# Patient Record
Sex: FEMALE | Race: WHITE | Employment: OTHER | URBAN - METROPOLITAN AREA
[De-identification: names, ages, dates, MRNs, and addresses within clinical notes are randomized per-mention and may not be internally consistent; named-entity substitution may affect disease eponyms.]

---

## 2017-01-08 ENCOUNTER — HOSPITAL ENCOUNTER (EMERGENCY)
Facility: HOSPITAL | Age: 57
Discharge: HOME | End: 2017-01-08
Payer: COMMERCIAL

## 2017-01-08 ENCOUNTER — APPOINTMENT (OUTPATIENT)
Dept: GENERAL RADIOLOGY | Facility: HOSPITAL | Age: 57
End: 2017-01-08
Payer: COMMERCIAL

## 2017-01-08 VITALS
WEIGHT: 180 LBS | HEART RATE: 67 BPM | RESPIRATION RATE: 16 BRPM | HEIGHT: 64 IN | SYSTOLIC BLOOD PRESSURE: 122 MMHG | DIASTOLIC BLOOD PRESSURE: 81 MMHG | TEMPERATURE: 98 F | OXYGEN SATURATION: 98 % | BODY MASS INDEX: 30.73 KG/M2

## 2017-01-08 DIAGNOSIS — S01.21XA NASAL LACERATION, INITIAL ENCOUNTER: Primary | ICD-10-CM

## 2017-01-08 PROCEDURE — 90471 IMMUNIZATION ADMIN: CPT

## 2017-01-08 PROCEDURE — 12011 RPR F/E/E/N/L/M 2.5 CM/<: CPT

## 2017-01-08 PROCEDURE — 99283 EMERGENCY DEPT VISIT LOW MDM: CPT

## 2017-01-08 NOTE — ED NOTES
Patient provided with discharge instructions and follow up information. Patient verbalized understanding of instructions without any questions. Patient ambulatory out of ER with steady gait.

## 2017-01-08 NOTE — ED NOTES
Patient tripped on sidewalk tonight denies any LOC, no ETOH, and landed on her face. Patient denies any pain, swelling and small laceration noted to tip of nose. Patient a/o and acting appropriate for age.
